# Patient Record
Sex: FEMALE | Race: WHITE | NOT HISPANIC OR LATINO | Employment: OTHER | URBAN - METROPOLITAN AREA
[De-identification: names, ages, dates, MRNs, and addresses within clinical notes are randomized per-mention and may not be internally consistent; named-entity substitution may affect disease eponyms.]

---

## 2023-06-16 DIAGNOSIS — E03.8 OTHER SPECIFIED HYPOTHYROIDISM: Primary | ICD-10-CM

## 2023-06-17 RX ORDER — LEVOTHYROXINE SODIUM 112 UG/1
TABLET ORAL
Qty: 90 TABLET | Refills: 3 | Status: SHIPPED | OUTPATIENT
Start: 2023-06-17 | End: 2024-01-30

## 2023-11-01 ENCOUNTER — APPOINTMENT (OUTPATIENT)
Dept: OTOLARYNGOLOGY | Facility: CLINIC | Age: 68
End: 2023-11-01
Payer: MEDICARE

## 2023-12-06 ENCOUNTER — APPOINTMENT (OUTPATIENT)
Dept: OTOLARYNGOLOGY | Facility: CLINIC | Age: 68
End: 2023-12-06
Payer: MEDICARE

## 2023-12-07 ENCOUNTER — ANCILLARY PROCEDURE (OUTPATIENT)
Dept: RADIOLOGY | Facility: CLINIC | Age: 68
End: 2023-12-07
Payer: MEDICARE

## 2023-12-07 DIAGNOSIS — Z12.31 SCREENING MAMMOGRAM FOR BREAST CANCER: ICD-10-CM

## 2023-12-07 PROCEDURE — 77063 BREAST TOMOSYNTHESIS BI: CPT | Performed by: RADIOLOGY

## 2023-12-07 PROCEDURE — 77067 SCR MAMMO BI INCL CAD: CPT | Performed by: RADIOLOGY

## 2023-12-07 PROCEDURE — 77067 SCR MAMMO BI INCL CAD: CPT

## 2023-12-12 PROBLEM — E78.5 HYPERLIPIDEMIA: Status: ACTIVE | Noted: 2023-12-12

## 2023-12-12 PROBLEM — E06.3 HASHIMOTO'S THYROIDITIS: Status: ACTIVE | Noted: 2023-12-12

## 2023-12-12 PROBLEM — I49.5 SSS (SICK SINUS SYNDROME) (MULTI): Status: ACTIVE | Noted: 2023-12-12

## 2023-12-12 PROBLEM — M50.30 DDD (DEGENERATIVE DISC DISEASE), CERVICAL: Status: ACTIVE | Noted: 2023-12-12

## 2023-12-12 PROBLEM — Z95.0 PACEMAKER: Status: ACTIVE | Noted: 2023-12-12

## 2023-12-12 PROBLEM — E55.9 VITAMIN D DEFICIENCY: Status: ACTIVE | Noted: 2023-12-12

## 2023-12-12 PROBLEM — E78.49 FAMILIAL HYPERLIPIDEMIA, HIGH LDL: Status: ACTIVE | Noted: 2023-12-12

## 2023-12-12 PROBLEM — M67.814 BICEPS TENDONOSIS OF LEFT SHOULDER: Status: ACTIVE | Noted: 2023-12-12

## 2023-12-12 PROBLEM — Z86.79 HISTORY OF SICK SINUS SYNDROME: Status: ACTIVE | Noted: 2023-12-12

## 2023-12-12 RX ORDER — CYCLOBENZAPRINE HCL 10 MG
TABLET ORAL
COMMUNITY
End: 2023-12-18 | Stop reason: HOSPADM

## 2023-12-13 ENCOUNTER — OFFICE VISIT (OUTPATIENT)
Dept: PRIMARY CARE | Facility: CLINIC | Age: 68
End: 2023-12-13
Payer: MEDICARE

## 2023-12-13 ENCOUNTER — LAB (OUTPATIENT)
Dept: LAB | Facility: LAB | Age: 68
End: 2023-12-13
Payer: MEDICARE

## 2023-12-13 VITALS
BODY MASS INDEX: 30.86 KG/M2 | SYSTOLIC BLOOD PRESSURE: 142 MMHG | DIASTOLIC BLOOD PRESSURE: 84 MMHG | HEART RATE: 72 BPM | OXYGEN SATURATION: 98 % | WEIGHT: 166 LBS

## 2023-12-13 DIAGNOSIS — Z13.21 ENCOUNTER FOR VITAMIN DEFICIENCY SCREENING: ICD-10-CM

## 2023-12-13 DIAGNOSIS — E55.9 VITAMIN D DEFICIENCY: ICD-10-CM

## 2023-12-13 DIAGNOSIS — R73.9 HYPERGLYCEMIA: ICD-10-CM

## 2023-12-13 DIAGNOSIS — Z00.00 MEDICARE ANNUAL WELLNESS VISIT, SUBSEQUENT: Primary | ICD-10-CM

## 2023-12-13 DIAGNOSIS — Z13.6 ENCOUNTER FOR SPECIAL SCREENING EXAMINATION FOR CARDIOVASCULAR DISORDER: ICD-10-CM

## 2023-12-13 DIAGNOSIS — I49.5 SSS (SICK SINUS SYNDROME) (MULTI): ICD-10-CM

## 2023-12-13 DIAGNOSIS — E06.3 HASHIMOTO'S THYROIDITIS: ICD-10-CM

## 2023-12-13 DIAGNOSIS — D64.9 FATIGUE ASSOCIATED WITH ANEMIA: ICD-10-CM

## 2023-12-13 DIAGNOSIS — R21 FACIAL RASH: ICD-10-CM

## 2023-12-13 DIAGNOSIS — E78.2 MIXED HYPERLIPIDEMIA: ICD-10-CM

## 2023-12-13 DIAGNOSIS — M77.9 INFLAMMATION AROUND JOINT: ICD-10-CM

## 2023-12-13 LAB
25(OH)D3 SERPL-MCNC: 49 NG/ML (ref 30–100)
ALBUMIN SERPL BCP-MCNC: 4.2 G/DL (ref 3.4–5)
ALP SERPL-CCNC: 45 U/L (ref 33–136)
ALT SERPL W P-5'-P-CCNC: 13 U/L (ref 7–45)
ANION GAP SERPL CALC-SCNC: 15 MMOL/L (ref 10–20)
AST SERPL W P-5'-P-CCNC: 23 U/L (ref 9–39)
BASOPHILS # BLD AUTO: 0.08 X10*3/UL (ref 0–0.1)
BASOPHILS NFR BLD AUTO: 2 %
BILIRUB SERPL-MCNC: 0.6 MG/DL (ref 0–1.2)
BUN SERPL-MCNC: 15 MG/DL (ref 6–23)
CALCIUM SERPL-MCNC: 9.7 MG/DL (ref 8.6–10.6)
CHLORIDE SERPL-SCNC: 101 MMOL/L (ref 98–107)
CHOLEST SERPL-MCNC: 420 MG/DL (ref 0–199)
CHOLESTEROL/HDL RATIO: 4.9
CO2 SERPL-SCNC: 26 MMOL/L (ref 21–32)
CREAT SERPL-MCNC: 1.05 MG/DL (ref 0.5–1.05)
CRP SERPL-MCNC: 0.47 MG/DL
EOSINOPHIL # BLD AUTO: 0.15 X10*3/UL (ref 0–0.7)
EOSINOPHIL NFR BLD AUTO: 3.7 %
ERYTHROCYTE [DISTWIDTH] IN BLOOD BY AUTOMATED COUNT: 13.5 % (ref 11.5–14.5)
ERYTHROCYTE [SEDIMENTATION RATE] IN BLOOD BY WESTERGREN METHOD: 18 MM/H (ref 0–30)
EST. AVERAGE GLUCOSE BLD GHB EST-MCNC: 111 MG/DL
FERRITIN SERPL-MCNC: 211 NG/ML (ref 8–150)
GFR SERPL CREATININE-BSD FRML MDRD: 58 ML/MIN/1.73M*2
GLUCOSE SERPL-MCNC: 79 MG/DL (ref 74–99)
HBA1C MFR BLD: 5.5 %
HCT VFR BLD AUTO: 38.1 % (ref 36–46)
HCYS SERPL-SCNC: 15.41 UMOL/L (ref 5–13.9)
HDLC SERPL-MCNC: 85.5 MG/DL
HGB BLD-MCNC: 12.5 G/DL (ref 12–16)
IMM GRANULOCYTES # BLD AUTO: 0.01 X10*3/UL (ref 0–0.7)
IMM GRANULOCYTES NFR BLD AUTO: 0.2 % (ref 0–0.9)
LDLC SERPL CALC-MCNC: 317 MG/DL
LYMPHOCYTES # BLD AUTO: 1.54 X10*3/UL (ref 1.2–4.8)
LYMPHOCYTES NFR BLD AUTO: 37.7 %
MCH RBC QN AUTO: 32.7 PG (ref 26–34)
MCHC RBC AUTO-ENTMCNC: 32.8 G/DL (ref 32–36)
MCV RBC AUTO: 100 FL (ref 80–100)
MONOCYTES # BLD AUTO: 0.26 X10*3/UL (ref 0.1–1)
MONOCYTES NFR BLD AUTO: 6.4 %
NEUTROPHILS # BLD AUTO: 2.04 X10*3/UL (ref 1.2–7.7)
NEUTROPHILS NFR BLD AUTO: 50 %
NON HDL CHOLESTEROL: 335 MG/DL (ref 0–149)
NRBC BLD-RTO: 0 /100 WBCS (ref 0–0)
PLATELET # BLD AUTO: 290 X10*3/UL (ref 150–450)
POTASSIUM SERPL-SCNC: 4.4 MMOL/L (ref 3.5–5.3)
PROT SERPL-MCNC: 7.5 G/DL (ref 6.4–8.2)
RBC # BLD AUTO: 3.82 X10*6/UL (ref 4–5.2)
SODIUM SERPL-SCNC: 138 MMOL/L (ref 136–145)
T3FREE SERPL-MCNC: 1.2 PG/ML (ref 2.3–4.2)
T4 FREE SERPL-MCNC: 0.37 NG/DL (ref 0.78–1.48)
THYROPEROXIDASE AB SERPL-ACNC: 38 IU/ML
TRIGL SERPL-MCNC: 90 MG/DL (ref 0–149)
TSH SERPL-ACNC: >120 MIU/L (ref 0.44–3.98)
VLDL: 18 MG/DL (ref 0–40)
WBC # BLD AUTO: 4.1 X10*3/UL (ref 4.4–11.3)

## 2023-12-13 PROCEDURE — 36415 COLL VENOUS BLD VENIPUNCTURE: CPT

## 2023-12-13 PROCEDURE — 84439 ASSAY OF FREE THYROXINE: CPT

## 2023-12-13 PROCEDURE — 1124F ACP DISCUSS-NO DSCNMKR DOCD: CPT | Performed by: STUDENT IN AN ORGANIZED HEALTH CARE EDUCATION/TRAINING PROGRAM

## 2023-12-13 PROCEDURE — 85025 COMPLETE CBC W/AUTO DIFF WBC: CPT

## 2023-12-13 PROCEDURE — 1160F RVW MEDS BY RX/DR IN RCRD: CPT | Performed by: STUDENT IN AN ORGANIZED HEALTH CARE EDUCATION/TRAINING PROGRAM

## 2023-12-13 PROCEDURE — 1159F MED LIST DOCD IN RCRD: CPT | Performed by: STUDENT IN AN ORGANIZED HEALTH CARE EDUCATION/TRAINING PROGRAM

## 2023-12-13 PROCEDURE — 1170F FXNL STATUS ASSESSED: CPT | Performed by: STUDENT IN AN ORGANIZED HEALTH CARE EDUCATION/TRAINING PROGRAM

## 2023-12-13 PROCEDURE — 83036 HEMOGLOBIN GLYCOSYLATED A1C: CPT

## 2023-12-13 PROCEDURE — 86140 C-REACTIVE PROTEIN: CPT

## 2023-12-13 PROCEDURE — 86376 MICROSOMAL ANTIBODY EACH: CPT

## 2023-12-13 PROCEDURE — 86800 THYROGLOBULIN ANTIBODY: CPT

## 2023-12-13 PROCEDURE — 1036F TOBACCO NON-USER: CPT | Performed by: STUDENT IN AN ORGANIZED HEALTH CARE EDUCATION/TRAINING PROGRAM

## 2023-12-13 PROCEDURE — 83090 ASSAY OF HOMOCYSTEINE: CPT

## 2023-12-13 PROCEDURE — 82728 ASSAY OF FERRITIN: CPT

## 2023-12-13 PROCEDURE — G0439 PPPS, SUBSEQ VISIT: HCPCS | Performed by: STUDENT IN AN ORGANIZED HEALTH CARE EDUCATION/TRAINING PROGRAM

## 2023-12-13 PROCEDURE — 99213 OFFICE O/P EST LOW 20 MIN: CPT | Performed by: STUDENT IN AN ORGANIZED HEALTH CARE EDUCATION/TRAINING PROGRAM

## 2023-12-13 PROCEDURE — 84481 FREE ASSAY (FT-3): CPT

## 2023-12-13 PROCEDURE — 85652 RBC SED RATE AUTOMATED: CPT

## 2023-12-13 PROCEDURE — 80061 LIPID PANEL: CPT

## 2023-12-13 PROCEDURE — 82306 VITAMIN D 25 HYDROXY: CPT

## 2023-12-13 PROCEDURE — 84443 ASSAY THYROID STIM HORMONE: CPT

## 2023-12-13 PROCEDURE — 80053 COMPREHEN METABOLIC PANEL: CPT

## 2023-12-13 ASSESSMENT — ENCOUNTER SYMPTOMS
RESPIRATORY NEGATIVE: 1
GASTROINTESTINAL NEGATIVE: 1
NEUROLOGICAL NEGATIVE: 1
ARTHRALGIAS: 1
PSYCHIATRIC NEGATIVE: 1
CARDIOVASCULAR NEGATIVE: 1
CONSTITUTIONAL NEGATIVE: 1

## 2023-12-13 ASSESSMENT — PATIENT HEALTH QUESTIONNAIRE - PHQ9
2. FEELING DOWN, DEPRESSED OR HOPELESS: NOT AT ALL
SUM OF ALL RESPONSES TO PHQ9 QUESTIONS 1 AND 2: 0
1. LITTLE INTEREST OR PLEASURE IN DOING THINGS: NOT AT ALL

## 2023-12-13 ASSESSMENT — ACTIVITIES OF DAILY LIVING (ADL)
BATHING: INDEPENDENT
MANAGING_FINANCES: INDEPENDENT
DOING_HOUSEWORK: INDEPENDENT
TAKING_MEDICATION: INDEPENDENT
DRESSING: INDEPENDENT
GROCERY_SHOPPING: INDEPENDENT

## 2023-12-13 NOTE — PROGRESS NOTES
Subjective   Patient ID: Iva Becker is a 68 y.o. female who presents for Annual Exam (Rash on face).  Rash on her left cheek that appeared in October. Had started some new cosmetic products around her eyes. Stopped these. Still having some redness on her left cheek. Is itchy occasionally.     Is due for her skin check. Saw derm 2 years ago.     Says she has chronic hearing changes. Seeing ENT for follow up tomorrow. Uses flonase occasionally. Feels like she needs to pop her ears constantly.     Sleeping well.     Watches her diet closely.     Wants multiple labs checked. Concerned about inflammatory levels.     Reports colonoscopy 2020 in [place].     Declines bone density screening.         Review of Systems   Constitutional: Negative.    HENT:  Positive for hearing loss.    Respiratory: Negative.     Cardiovascular: Negative.    Gastrointestinal: Negative.    Musculoskeletal:  Positive for arthralgias.   Skin:  Positive for rash.   Neurological: Negative.    Psychiatric/Behavioral: Negative.     All other systems reviewed and are negative.      Objective   Physical Exam  Vitals reviewed.   Constitutional:       General: She is not in acute distress.  HENT:      Head: Normocephalic.      Right Ear: External ear normal.      Left Ear: External ear normal.      Nose: Nose normal.   Eyes:      Extraocular Movements: Extraocular movements intact.      Pupils: Pupils are equal, round, and reactive to light.   Cardiovascular:      Rate and Rhythm: Normal rate and regular rhythm.      Heart sounds: Normal heart sounds.   Pulmonary:      Effort: Pulmonary effort is normal.      Breath sounds: Normal breath sounds.   Abdominal:      Palpations: Abdomen is soft.      Tenderness: There is no abdominal tenderness. There is no guarding.   Musculoskeletal:      Cervical back: Normal range of motion and neck supple.   Skin:     General: Skin is warm and dry.      Comments: Left cheek with erythematous dry patch and small  ulceration   Neurological:      Mental Status: She is alert. Mental status is at baseline.   Psychiatric:         Mood and Affect: Mood normal.         Behavior: Behavior normal.         Body mass index is 30.86 kg/m².      Current Outpatient Medications:     fish oil concentrate (Omega-3) 120-180 mg capsule, Take 1 capsule (1 g) by mouth once daily., Disp: , Rfl:     levothyroxine (Synthroid, Levoxyl) 112 mcg tablet, TAKE 1 TABLET BY MOUTH  DAILY, Disp: 90 tablet, Rfl: 3    cyclobenzaprine (Flexeril) 10 mg tablet, Take 1 tablet 3 times a day by oral route as needed., Disp: , Rfl:       Assessment/Plan   Diagnoses and all orders for this visit:  Medicare annual wellness visit, subsequent  Comments:  UTD on mammogram  need to get colonoscopy records  will get flu shot with   declines DEXA  SSS (sick sinus syndrome) (CMS/[facility])  Hashimoto's thyroiditis  -     T3, free; Future  -     T4, free; Future  -     Thyroid Peroxidase (TPO) Antibody; Future  -     Anti-Thyroglobulin Antibody; Future  -     TSH; Future  -     CBC and Auto Differential; Future  Mixed hyperlipidemia  -     Lipid panel; Future  Inflammation around joint  -     Homocysteine, serum; Future  -     C-reactive protein; Future  -     Sedimentation Rate; Future  -     Ferritin; Future  Hyperglycemia  -     Comprehensive metabolic panel; Future  -     Hemoglobin A1c; Future  Encounter for vitamin deficiency screening  -     Vitamin D 25-Hydroxy,Total (for eval of Vitamin D levels); Future  Encounter for special screening examination for cardiovascular disorder  -     Homocysteine, serum; Future  Fatigue associated with anemia  -     Ferritin; Future  Vitamin D deficiency  -     Vitamin D 25-Hydroxy,Total (for eval of Vitamin D levels); Future  Facial rash  Comments:  recommend following up with derm    Follow up as needed       Dorcas Bedolla DO 12/13/23 11:31 AM

## 2023-12-14 ENCOUNTER — OFFICE VISIT (OUTPATIENT)
Dept: OTOLARYNGOLOGY | Facility: CLINIC | Age: 68
End: 2023-12-14
Payer: MEDICARE

## 2023-12-14 VITALS
DIASTOLIC BLOOD PRESSURE: 78 MMHG | HEIGHT: 61 IN | WEIGHT: 165.57 LBS | RESPIRATION RATE: 16 BRPM | SYSTOLIC BLOOD PRESSURE: 117 MMHG | HEART RATE: 67 BPM | TEMPERATURE: 97.5 F | BODY MASS INDEX: 31.26 KG/M2 | OXYGEN SATURATION: 100 %

## 2023-12-14 DIAGNOSIS — J31.0 CHRONIC RHINITIS: Primary | ICD-10-CM

## 2023-12-14 DIAGNOSIS — E06.3 HASHIMOTO'S THYROIDITIS: Primary | ICD-10-CM

## 2023-12-14 DIAGNOSIS — H93.8X3 SENSATION OF FULLNESS IN BOTH EARS: ICD-10-CM

## 2023-12-14 DIAGNOSIS — H93.13 TINNITUS OF BOTH EARS: ICD-10-CM

## 2023-12-14 DIAGNOSIS — H90.3 BILATERAL SENSORINEURAL HEARING LOSS: ICD-10-CM

## 2023-12-14 LAB — THYROGLOB AB SERPL-ACNC: 1.4 IU/ML (ref 0–4)

## 2023-12-14 PROCEDURE — 1160F RVW MEDS BY RX/DR IN RCRD: CPT | Performed by: STUDENT IN AN ORGANIZED HEALTH CARE EDUCATION/TRAINING PROGRAM

## 2023-12-14 PROCEDURE — 99204 OFFICE O/P NEW MOD 45 MIN: CPT | Performed by: STUDENT IN AN ORGANIZED HEALTH CARE EDUCATION/TRAINING PROGRAM

## 2023-12-14 PROCEDURE — 1126F AMNT PAIN NOTED NONE PRSNT: CPT | Performed by: STUDENT IN AN ORGANIZED HEALTH CARE EDUCATION/TRAINING PROGRAM

## 2023-12-14 PROCEDURE — 1036F TOBACCO NON-USER: CPT | Performed by: STUDENT IN AN ORGANIZED HEALTH CARE EDUCATION/TRAINING PROGRAM

## 2023-12-14 PROCEDURE — 99214 OFFICE O/P EST MOD 30 MIN: CPT | Performed by: STUDENT IN AN ORGANIZED HEALTH CARE EDUCATION/TRAINING PROGRAM

## 2023-12-14 PROCEDURE — 1159F MED LIST DOCD IN RCRD: CPT | Performed by: STUDENT IN AN ORGANIZED HEALTH CARE EDUCATION/TRAINING PROGRAM

## 2023-12-14 RX ORDER — BENZOCAINE .13; .15; .5; 2 G/100G; G/100G; G/100G; G/100G
2 GEL ORAL DAILY
Qty: 8.6 G | Refills: 11 | Status: SHIPPED | OUTPATIENT
Start: 2023-12-14 | End: 2024-02-21

## 2023-12-14 ASSESSMENT — COLUMBIA-SUICIDE SEVERITY RATING SCALE - C-SSRS
2. HAVE YOU ACTUALLY HAD ANY THOUGHTS OF KILLING YOURSELF?: NO
1. IN THE PAST MONTH, HAVE YOU WISHED YOU WERE DEAD OR WISHED YOU COULD GO TO SLEEP AND NOT WAKE UP?: NO
6. HAVE YOU EVER DONE ANYTHING, STARTED TO DO ANYTHING, OR PREPARED TO DO ANYTHING TO END YOUR LIFE?: NO

## 2023-12-14 ASSESSMENT — ENCOUNTER SYMPTOMS
DEPRESSION: 0
OCCASIONAL FEELINGS OF UNSTEADINESS: 0
LOSS OF SENSATION IN FEET: 0

## 2023-12-14 ASSESSMENT — PAIN SCALES - GENERAL: PAINLEVEL: 0-NO PAIN

## 2023-12-14 NOTE — PROGRESS NOTES
SUBJECTIVE  Patient ID: Iva Becker is a 68 y.o. female who presents for New Patient Visit (Bilateral ear pressure/Dizziness related to pressure /tinnitus).    The patient reports that since January 2023 she reports a feeling of pressure in her ears; bilateral. She reports that she has some acoustic trauma at a wedding. She notes intermittent episodes of dizziness; described as loss of balance - somewhat orthostatic in nature. They endorse hearing loss and tinnitus. They deny otalgia and otorrhea. They deny a history of prior ear surgery, other noise exposure, exposure to ototoxic drugs or agents, and family history of hearing loss.     Notes difficulty sinuses. Reports a yearly infection requiring antibiotics and steroids. Uses Nasacort occasionally (worried about worsening small cataract on the left). Uses saline occasionally. No history of nasal/sinus surgery. Has a history of environmental allergies.    Review of Systems  Complete ROS negative except as noted above or on patient intake form and as above.    OBJECTIVE  Physical Exam  CONSTITUTIONAL: Well appearing female who appears stated age.  PSYCHIATRIC: Alert, appropriate mood and affect.  RESPIRATORY: Normal inspiration and expiration and chest wall expansion; no use of accessory muscles to breathe.  VOICE: Clear speech without hoarseness. No stridor nor stertor.  HEAD, FACE, AND SKIN: Symmetric facial feature. No cutaneous masses or lesions were visualized. The parotid and submandibular glands were normal to palpation.  EYES: Pupils were equal in size and reactive to light. Extra-ocular muscle function was intact. No nystagmus was observed. Vision was grossly intact.  EARS: External ears were normally formed with no lesions. The external auditory canals were clear. The tympanic membranes were intact and in the neutral position. No significant retraction pockets nor effusions were appreciated.  NOSE: Nasal dorsum was midline. Anterior rhinoscopy  demonstrated a septum midline. Inferior turbinates were moderately hypertrophied. Some clear drainage appreciated. No obvious nasal masses, polyps, mucopurulence, nor other lesions were appreciated.  ORAL CAVITY: Lips were without lesions. Moist mucous membranes. No lesions appreciated along the gingiva, oral mucosa, nor tongue.  DENTITION: Grossly normal without obvious infection nor inflammation.  OROPHARYNX: No lesion nor mucosal abnormality. The uvula was normal appearing. The tonsils were 0-1+.  NECK: Visualization and palpation of the neck revealed no mass lesions, no thyromegaly or thyroid masses. No cutaneous lesions appreciated.  LYMPHATICS (CERVICAL): There were no palpable lymph nodes in the posterior triangle, submandibular triangle, jugulodigastric region, nor central neck.  NEUROLOGIC: Cranial nerves III, IV, and VI were noted to be intact via extra-ocular muscle movement testing. Cranial nerve V was noted to be intact to soft touch bilaterally. Cranial nerve VII was noted to be intact and symmetric by facial movement. Cranial nerve VIII was tested with normal voice examination and revealed grossly normal hearing. Cranial nerves IX and X noted to be intact by palatal movement. Cranial nerve XI noted to be intact via shoulder shrug.  Cranial nerve XII noted to be intact with active and symmetric tongue movement.     Audiogram  I personally reviewed the patient's audiogram from February 2023.  This demonstrates a relatively symmetric normal sloping to moderate sloping to normal sensorineural hearing loss.  Word recognition scores are 92% on the right and 72% on the left.  She had type a tympanograms.  Acoustic reflexes were preserved.    Radiology  CT head w/o contast 2/25/2022  FINDINGS:   No mass or acute hemorrhage. No evidence of acute infarct.     ASSESSMENT/PLAN  Diagnoses and all orders for this visit:  Chronic rhinitis  -     budesonide (Rhinocort AQ) 32 mcg/actuation nasal spray; Administer 2  sprays into each nostril once daily.  Sensation of fullness in both ears  Bilateral sensorineural hearing loss  Tinnitus of both ears    68 y.o. female presenting with aural fullness in the setting of hearing loss. There is also history of chronic rhinitis.    1.  Bilateral aural fullness, bilateral sensorineural hearing loss, tinnitus  The patient presents to clinic today reporting bilateral aural fullness.  She was concerned that it could be secondary to an acoustic trauma that occurred at a wedding.  However, her audiogram suggest a potential hereditary pattern of hearing loss.  The patient was treated with an oral steroid initially on presentation to my local colleague (Rhoda Mccullough).  There was some discussion of obtaining an MRI but I do not appreciate enough asymmetry that this would be merited at this time.  She does have a prior CT head from an outside facility from 2022 which was negative for intracranial lesion.    There is some questionable component of eustachian tube dysfunction which could also be contributing to her symptoms.  See below.    2.  Chronic rhinitis, ?ETD  The patient is also noting longstanding difficulty with her sinuses.  She notes yearly infections that can take a prolonged course of antibiotics and steroids to clear.  She has been using Nasacort occasionally but has been worried about the potential of developing cataracts.  I recommended that she is concerned about orbital complications we sutured to a steroid spray with decreased absorption.  She was amenable to this suggestion budesonide was sent to her pharmacy.    She will follow-up in February with repeat audiogram to discuss both concerns.    This note was created using speech recognition transcription software. Despite proofreading, typographical errors may be present that affect the meaning of the content. Please contact my office with any questions.

## 2024-01-29 ENCOUNTER — LAB (OUTPATIENT)
Dept: LAB | Facility: LAB | Age: 69
End: 2024-01-29
Payer: MEDICARE

## 2024-01-29 DIAGNOSIS — E03.8 OTHER SPECIFIED HYPOTHYROIDISM: ICD-10-CM

## 2024-01-29 DIAGNOSIS — E06.3 HASHIMOTO'S THYROIDITIS: ICD-10-CM

## 2024-01-29 LAB
T4 FREE SERPL-MCNC: 1.7 NG/DL (ref 0.78–1.48)
TSH SERPL-ACNC: 9.37 MIU/L (ref 0.44–3.98)

## 2024-01-29 PROCEDURE — 84443 ASSAY THYROID STIM HORMONE: CPT

## 2024-01-29 PROCEDURE — 84439 ASSAY OF FREE THYROXINE: CPT

## 2024-01-29 PROCEDURE — 36415 COLL VENOUS BLD VENIPUNCTURE: CPT

## 2024-01-30 RX ORDER — LEVOTHYROXINE SODIUM 112 UG/1
TABLET ORAL
Qty: 90 TABLET | Refills: 3 | Status: SHIPPED | OUTPATIENT
Start: 2024-01-30

## 2024-02-21 ENCOUNTER — CLINICAL SUPPORT (OUTPATIENT)
Dept: AUDIOLOGY | Facility: CLINIC | Age: 69
End: 2024-02-21
Payer: MEDICARE

## 2024-02-21 ENCOUNTER — OFFICE VISIT (OUTPATIENT)
Dept: OTOLARYNGOLOGY | Facility: CLINIC | Age: 69
End: 2024-02-21
Payer: MEDICARE

## 2024-02-21 VITALS
WEIGHT: 164.5 LBS | BODY MASS INDEX: 30.27 KG/M2 | HEART RATE: 62 BPM | OXYGEN SATURATION: 99 % | HEIGHT: 62 IN | RESPIRATION RATE: 18 BRPM | TEMPERATURE: 98 F | SYSTOLIC BLOOD PRESSURE: 117 MMHG | DIASTOLIC BLOOD PRESSURE: 63 MMHG

## 2024-02-21 DIAGNOSIS — H93.8X3 EAR PRESSURE, BILATERAL: Primary | ICD-10-CM

## 2024-02-21 DIAGNOSIS — H93.8X3 SENSATION OF FULLNESS IN BOTH EARS: ICD-10-CM

## 2024-02-21 DIAGNOSIS — H90.3 ASYMMETRIC SNHL (SENSORINEURAL HEARING LOSS): Primary | ICD-10-CM

## 2024-02-21 PROCEDURE — 92557 COMPREHENSIVE HEARING TEST: CPT | Performed by: AUDIOLOGIST

## 2024-02-21 PROCEDURE — 99213 OFFICE O/P EST LOW 20 MIN: CPT | Performed by: STUDENT IN AN ORGANIZED HEALTH CARE EDUCATION/TRAINING PROGRAM

## 2024-02-21 PROCEDURE — 1160F RVW MEDS BY RX/DR IN RCRD: CPT | Performed by: STUDENT IN AN ORGANIZED HEALTH CARE EDUCATION/TRAINING PROGRAM

## 2024-02-21 PROCEDURE — 1036F TOBACCO NON-USER: CPT | Performed by: STUDENT IN AN ORGANIZED HEALTH CARE EDUCATION/TRAINING PROGRAM

## 2024-02-21 PROCEDURE — 1159F MED LIST DOCD IN RCRD: CPT | Performed by: STUDENT IN AN ORGANIZED HEALTH CARE EDUCATION/TRAINING PROGRAM

## 2024-02-21 PROCEDURE — 1126F AMNT PAIN NOTED NONE PRSNT: CPT | Performed by: STUDENT IN AN ORGANIZED HEALTH CARE EDUCATION/TRAINING PROGRAM

## 2024-02-21 PROCEDURE — 92567 TYMPANOMETRY: CPT | Performed by: AUDIOLOGIST

## 2024-02-21 SDOH — ECONOMIC STABILITY: FOOD INSECURITY: WITHIN THE PAST 12 MONTHS, YOU WORRIED THAT YOUR FOOD WOULD RUN OUT BEFORE YOU GOT MONEY TO BUY MORE.: NEVER TRUE

## 2024-02-21 SDOH — ECONOMIC STABILITY: FOOD INSECURITY: WITHIN THE PAST 12 MONTHS, THE FOOD YOU BOUGHT JUST DIDN'T LAST AND YOU DIDN'T HAVE MONEY TO GET MORE.: NEVER TRUE

## 2024-02-21 ASSESSMENT — LIFESTYLE VARIABLES
HOW OFTEN DO YOU HAVE SIX OR MORE DRINKS ON ONE OCCASION: NEVER
SKIP TO QUESTIONS 9-10: 1
AUDIT-C TOTAL SCORE: 1
HOW OFTEN DO YOU HAVE A DRINK CONTAINING ALCOHOL: MONTHLY OR LESS
HOW MANY STANDARD DRINKS CONTAINING ALCOHOL DO YOU HAVE ON A TYPICAL DAY: 1 OR 2

## 2024-02-21 ASSESSMENT — ENCOUNTER SYMPTOMS
OCCASIONAL FEELINGS OF UNSTEADINESS: 0
LOSS OF SENSATION IN FEET: 0
DEPRESSION: 0

## 2024-02-21 ASSESSMENT — PAIN SCALES - GENERAL: PAINLEVEL: 0-NO PAIN

## 2024-02-21 NOTE — PROGRESS NOTES
"SUBJECTIVE  Patient ID: Iva Becker is a 69 y.o. female who presents for No chief complaint on file..    History 12/14/2023:  The patient reports that since January 2023 she reports a feeling of pressure in her ears; bilateral. She reports that she has some acoustic trauma at a wedding. She notes intermittent episodes of dizziness; described as loss of balance - somewhat orthostatic in nature. They endorse hearing loss and tinnitus. They deny otalgia and otorrhea. They deny a history of prior ear surgery, other noise exposure, exposure to ototoxic drugs or agents, and family history of hearing loss.     Notes difficulty sinuses. Reports a yearly infection requiring antibiotics and steroids. Uses Nasacort occasionally (worried about worsening small cataract on the left). Uses saline occasionally. No history of nasal/sinus surgery. Has a history of environmental allergies.    Update 2/21/2024:  Follow-up for several concerns.  Ear fullness has not much changed. Had \"allergic reaction\" to Nasonex with swelling of the nose. Improved after stopping medication; no intervention. Has noted persistent bilateral ear fullness. Tried hearing aids but did not appreciate much change in pressure. Does note improvement in hearing (likes them in [employer]).  She has been irrigating every other day which she finds helpful for her sinuses. Feels that this has been slightly helpful for ear fullness.    OBJECTIVE  Physical Exam  CONSTITUTIONAL: Well appearing female who appears stated age.  PSYCHIATRIC: Alert, appropriate mood and affect.  RESPIRATORY: Normal inspiration and expiration and chest wall expansion; no use of accessory muscles to breathe.  VOICE: Clear speech without hoarseness. No stridor nor stertor.  HEAD, FACE, AND SKIN: Symmetric facial feature.  EYES: Pupils were equal in size and reactive to light. Extra-ocular muscle function was intact. No nystagmus was observed. Vision was grossly intact.  EARS: External ears were " normally formed with no lesions. The external auditory canals were clear. The tympanic membranes were intact and in the neutral position. No significant retraction pockets nor effusions were appreciated.  NOSE: Nasal dorsum was midline. Anterior rhinoscopy demonstrated a septum midline. Inferior turbinates were moderately hypertrophied. Some clear drainage appreciated. No obvious nasal masses, polyps, mucopurulence, nor other lesions were appreciated.    Audiogram  I personally viewed the patient's audiogram from today compared to her prior from February 2023.  This demonstrates a relatively symmetric normal sloping to moderate sloping to normal sensorineural hearing loss.  There is some mild asymmetry noted between 1002 1000 Hz with the left ear slightly worse than the right.  Word recognition scores are 88 % on the right and 84 % on the left.  She had type a tympanograms.        ASSESSMENT/PLAN  Diagnoses and all orders for this visit:  Asymmetric SNHL (sensorineural hearing loss)  -     MR IAC w and wo IV contrast; Future  Sensation of fullness in both ears    69 y.o. female presenting with aural fullness in the setting of hearing loss. There is also history of chronic rhinitis.    1.  Bilateral aural fullness, bilateral/asymmetric sensorineural hearing loss, tinnitus  The patient presents to clinic today reporting bilateral aural fullness.  She was concerned that it could be secondary to an acoustic trauma that occurred at a wedding.  However, her audiogram suggest a potential hereditary pattern of hearing loss.  The patient was treated with an oral steroid initially on presentation to my local colleague (Rhoda Mccullough).  There was some discussion of obtaining an MRI but I do not appreciate enough asymmetry that this would be merited at this time.  She does have a prior CT head from an outside facility from 2022 which was negative for intracranial lesion.    The patient returned with follow-up audiogram that  confirms mild asymmetry that is relatively stable over the last year.  Patient is now more interested in potential MRI to evaluate this asymmetry.  This has the added benefit of evaluating her sinuses and the eustachian tube orifice.    There is some questionable component of eustachian tube dysfunction which could also be contributing to her symptoms.  See below.    2.  Chronic rhinitis, ?ETD  The patient is also noting longstanding difficulty with her sinuses.  She notes yearly infections that can take a prolonged course of antibiotics and steroids to clear.  She previously used Nasacort occasionally but was concerned about development of cataracts and so we will switch her to budesonide; she stopped this when she developed significant swelling, potentially secondary to an allergic reaction from this medication.  We discussed potentially retrialing her on Flonase or Nasacort in the future.  Given her type a tympanograms I am concerned that myringotomy may not be of great benefit.  It may be that this is simply a component of her hearing loss manifesting.  We will review the MRI    This note was created using speech recognition transcription software. Despite proofreading, typographical errors may be present that affect the meaning of the content. Please contact my office with any questions.

## 2024-02-21 NOTE — PROGRESS NOTES
"AUDIOLOGY ADULT AUDIOMETRIC EVALUATION      Name:  Iva Becker  :  1955  Age:  69 y.o.  Date of Evaluation:  2024    HISTORY  Reason for visit:  hearing loss  Ms. Becker is seen 2024 at the request of Guido Morgan M.D. for an evaluation of hearing.      Chief complaint:    tinnitus    Hearing loss:  hearing is better when ear pressure is not there; patient has a history of perceived sudden change after hearing a band in 2023.  Tinnitus:   no change  Otitis Media: denies  Otologic surgical history:  denies  Dizziness/imbalance:  experiences brief dizziness, light-headedness with ear pressure  Otalgia:  denies  Ear pressure/fullness:  intermittent ear pressure which can sometimes be improved by popping ears  History of excessive noise exposure:  denies    Hearing aid history: wears hearing aids on a part-time basis; obtained around 2023         EVALUATION  Please find audiogram in \"Media\" tab (Document Type:  Audiology Report) or included at the bottom of this note.    RESULTS   Otoscopic Evaluation: .clear canals bilaterally      Immittance Measures (226 Hz probe tone):   Tympanometry is consistent with normal middle ear pressure and normal tympanic membrane mobility bilaterally.       Ipsilateral acoustic reflexes (500-4000 Hz) are present for the right ear for 500-4000 Hz and could not be tested for the left ear due to inadequate seal.      Test technique:  standard behavioral technique via insert earphones.  Reliability is good.    Pure Tone Audiometry:   Right ear:  Hearing sensitivity is in the normal hearing to mild hearing loss range.     Left ear: Hearing sensitivity is in the normal hearing to moderate hearing loss range bilaterally.  Note asymmetry for 9940-8667 Hz (left worse than right) and 8000 Hz (right worse than left)     Speech Audiometry:        Right Ear:  Speech Reception Threshold (SRT) was obtained at 25 dBHL                 Speech discrimination score was " 88% in quiet when words were presented at 75 dBHL      Left Ear:  Speech Reception Threshold (SRT) was obtained at 30 dBHL                 Speech discrimination score was 84% in quiet when words were presented at 80 dBHL    IMPRESSIONS:  In comparison with previous audiogram of 2/22/2023, hearing is stable bilaterally.    Patient is expected to have communication difficulty in adverse listening environments.    Patient is expected to continue to benefit from devices that provide amplification (e.g., hearing aids) and improve the desired sound signal over that of background noise, as well as from effective communication strategies.      RECOMMENDATIONS  Continue with ENT follow-up with Guido Morgan M.D.   Continue with hearing aid use.     Reassess hearing in 1 year (or sooner if medically indicated or if there is a concern for a change in hearing).    Continue with medical follow-up as indicated.       PATIENT EDUCATION  Discussed results and recommendations with patient.  Questions were addressed and the patient was encouraged to contact our department should concerns arise.       FAIZAN Thomas, [facility]  Licensed Audiologist

## 2024-02-27 PROBLEM — H93.8X3 SENSATION OF FULLNESS IN BOTH EARS: Status: ACTIVE | Noted: 2024-02-27

## 2024-02-27 PROBLEM — H90.3 ASYMMETRIC SNHL (SENSORINEURAL HEARING LOSS): Status: ACTIVE | Noted: 2024-02-27

## 2024-03-05 DIAGNOSIS — Z95.0 PACEMAKER: ICD-10-CM

## 2024-03-05 DIAGNOSIS — Z95.0 HISTORY OF CARDIAC PACEMAKER: ICD-10-CM

## 2024-04-03 ENCOUNTER — APPOINTMENT (OUTPATIENT)
Dept: CARDIOLOGY | Facility: CLINIC | Age: 69
End: 2024-04-03
Payer: MEDICARE

## 2024-04-10 ENCOUNTER — HOSPITAL ENCOUNTER (OUTPATIENT)
Dept: CARDIOLOGY | Facility: CLINIC | Age: 69
Discharge: HOME | End: 2024-04-10
Payer: MEDICARE

## 2024-04-10 DIAGNOSIS — I49.5 SICK SINUS SYNDROME (MULTI): ICD-10-CM

## 2024-04-10 DIAGNOSIS — Z95.0 PRESENCE OF CARDIAC PACEMAKER: ICD-10-CM

## 2024-04-10 PROCEDURE — 93280 PM DEVICE PROGR EVAL DUAL: CPT | Performed by: INTERNAL MEDICINE

## 2024-04-10 PROCEDURE — 93280 PM DEVICE PROGR EVAL DUAL: CPT

## 2024-04-11 DIAGNOSIS — I49.5 SICK SINUS SYNDROME (MULTI): ICD-10-CM

## 2024-04-11 DIAGNOSIS — Z95.0 PRESENCE OF CARDIAC PACEMAKER: ICD-10-CM

## 2024-04-12 ENCOUNTER — HOSPITAL ENCOUNTER (OUTPATIENT)
Dept: RADIOLOGY | Facility: CLINIC | Age: 69
Discharge: HOME | End: 2024-04-12
Payer: MEDICARE

## 2024-04-12 DIAGNOSIS — Z95.0 PACEMAKER: ICD-10-CM

## 2024-04-12 DIAGNOSIS — Z95.0 HISTORY OF CARDIAC PACEMAKER: ICD-10-CM

## 2024-04-12 PROCEDURE — 71046 X-RAY EXAM CHEST 2 VIEWS: CPT

## 2024-04-12 PROCEDURE — 71046 X-RAY EXAM CHEST 2 VIEWS: CPT | Performed by: RADIOLOGY

## 2024-04-13 ENCOUNTER — APPOINTMENT (OUTPATIENT)
Dept: CARDIOLOGY | Facility: HOSPITAL | Age: 69
End: 2024-04-13
Payer: MEDICARE

## 2024-04-13 ENCOUNTER — APPOINTMENT (OUTPATIENT)
Dept: RADIOLOGY | Facility: HOSPITAL | Age: 69
End: 2024-04-13
Payer: MEDICARE

## 2024-04-13 ENCOUNTER — HOSPITAL ENCOUNTER (EMERGENCY)
Facility: HOSPITAL | Age: 69
Discharge: HOME | End: 2024-04-14
Attending: STUDENT IN AN ORGANIZED HEALTH CARE EDUCATION/TRAINING PROGRAM
Payer: MEDICARE

## 2024-04-13 DIAGNOSIS — K59.00 CONSTIPATION, UNSPECIFIED CONSTIPATION TYPE: Primary | ICD-10-CM

## 2024-04-13 DIAGNOSIS — R93.5 ABNORMAL CT OF THE ABDOMEN: ICD-10-CM

## 2024-04-13 LAB
ALBUMIN SERPL BCP-MCNC: 4.3 G/DL (ref 3.4–5)
ALP SERPL-CCNC: 50 U/L (ref 33–136)
ALT SERPL W P-5'-P-CCNC: 13 U/L (ref 7–45)
ANION GAP SERPL CALC-SCNC: 13 MMOL/L (ref 10–20)
APPEARANCE UR: CLEAR
AST SERPL W P-5'-P-CCNC: 18 U/L (ref 9–39)
BASOPHILS # BLD AUTO: 0.07 X10*3/UL (ref 0–0.1)
BASOPHILS NFR BLD AUTO: 0.6 %
BILIRUB SERPL-MCNC: 0.3 MG/DL (ref 0–1.2)
BILIRUB UR STRIP.AUTO-MCNC: NEGATIVE MG/DL
BUN SERPL-MCNC: 22 MG/DL (ref 6–23)
CALCIUM SERPL-MCNC: 9.8 MG/DL (ref 8.6–10.3)
CHLORIDE SERPL-SCNC: 104 MMOL/L (ref 98–107)
CO2 SERPL-SCNC: 26 MMOL/L (ref 21–32)
COLOR UR: ABNORMAL
CREAT SERPL-MCNC: 1.1 MG/DL (ref 0.5–1.05)
EGFRCR SERPLBLD CKD-EPI 2021: 54 ML/MIN/1.73M*2
EOSINOPHIL # BLD AUTO: 0.21 X10*3/UL (ref 0–0.7)
EOSINOPHIL NFR BLD AUTO: 1.9 %
ERYTHROCYTE [DISTWIDTH] IN BLOOD BY AUTOMATED COUNT: 12.8 % (ref 11.5–14.5)
GLUCOSE SERPL-MCNC: 105 MG/DL (ref 74–99)
GLUCOSE UR STRIP.AUTO-MCNC: NEGATIVE MG/DL
HCT VFR BLD AUTO: 41.6 % (ref 36–46)
HGB BLD-MCNC: 13.9 G/DL (ref 12–16)
IMM GRANULOCYTES # BLD AUTO: 0.03 X10*3/UL (ref 0–0.7)
IMM GRANULOCYTES NFR BLD AUTO: 0.3 % (ref 0–0.9)
KETONES UR STRIP.AUTO-MCNC: NEGATIVE MG/DL
LEUKOCYTE ESTERASE UR QL STRIP.AUTO: NEGATIVE
LIPASE SERPL-CCNC: 25 U/L (ref 9–82)
LYMPHOCYTES # BLD AUTO: 2.41 X10*3/UL (ref 1.2–4.8)
LYMPHOCYTES NFR BLD AUTO: 21.9 %
MCH RBC QN AUTO: 31.5 PG (ref 26–34)
MCHC RBC AUTO-ENTMCNC: 33.4 G/DL (ref 32–36)
MCV RBC AUTO: 94 FL (ref 80–100)
MONOCYTES # BLD AUTO: 0.81 X10*3/UL (ref 0.1–1)
MONOCYTES NFR BLD AUTO: 7.4 %
NEUTROPHILS # BLD AUTO: 7.45 X10*3/UL (ref 1.2–7.7)
NEUTROPHILS NFR BLD AUTO: 67.9 %
NITRITE UR QL STRIP.AUTO: NEGATIVE
NRBC BLD-RTO: 0 /100 WBCS (ref 0–0)
PH UR STRIP.AUTO: 6 [PH]
PLATELET # BLD AUTO: 317 X10*3/UL (ref 150–450)
POTASSIUM SERPL-SCNC: 3.6 MMOL/L (ref 3.5–5.3)
PROT SERPL-MCNC: 7.5 G/DL (ref 6.4–8.2)
PROT UR STRIP.AUTO-MCNC: NEGATIVE MG/DL
RBC # BLD AUTO: 4.41 X10*6/UL (ref 4–5.2)
RBC # UR STRIP.AUTO: NEGATIVE /UL
SODIUM SERPL-SCNC: 139 MMOL/L (ref 136–145)
SP GR UR STRIP.AUTO: 1
UROBILINOGEN UR STRIP.AUTO-MCNC: <2 MG/DL
WBC # BLD AUTO: 11 X10*3/UL (ref 4.4–11.3)

## 2024-04-13 PROCEDURE — 74177 CT ABD & PELVIS W/CONTRAST: CPT | Performed by: STUDENT IN AN ORGANIZED HEALTH CARE EDUCATION/TRAINING PROGRAM

## 2024-04-13 PROCEDURE — 84075 ASSAY ALKALINE PHOSPHATASE: CPT | Performed by: STUDENT IN AN ORGANIZED HEALTH CARE EDUCATION/TRAINING PROGRAM

## 2024-04-13 PROCEDURE — 36415 COLL VENOUS BLD VENIPUNCTURE: CPT | Performed by: STUDENT IN AN ORGANIZED HEALTH CARE EDUCATION/TRAINING PROGRAM

## 2024-04-13 PROCEDURE — 2550000001 HC RX 255 CONTRASTS: Performed by: STUDENT IN AN ORGANIZED HEALTH CARE EDUCATION/TRAINING PROGRAM

## 2024-04-13 PROCEDURE — 83690 ASSAY OF LIPASE: CPT | Performed by: STUDENT IN AN ORGANIZED HEALTH CARE EDUCATION/TRAINING PROGRAM

## 2024-04-13 PROCEDURE — 85025 COMPLETE CBC W/AUTO DIFF WBC: CPT | Performed by: STUDENT IN AN ORGANIZED HEALTH CARE EDUCATION/TRAINING PROGRAM

## 2024-04-13 PROCEDURE — 2500000004 HC RX 250 GENERAL PHARMACY W/ HCPCS (ALT 636 FOR OP/ED): Performed by: STUDENT IN AN ORGANIZED HEALTH CARE EDUCATION/TRAINING PROGRAM

## 2024-04-13 PROCEDURE — 99284 EMERGENCY DEPT VISIT MOD MDM: CPT | Mod: 25

## 2024-04-13 PROCEDURE — 93005 ELECTROCARDIOGRAM TRACING: CPT

## 2024-04-13 PROCEDURE — 74177 CT ABD & PELVIS W/CONTRAST: CPT

## 2024-04-13 PROCEDURE — 81003 URINALYSIS AUTO W/O SCOPE: CPT | Performed by: STUDENT IN AN ORGANIZED HEALTH CARE EDUCATION/TRAINING PROGRAM

## 2024-04-13 PROCEDURE — 96374 THER/PROPH/DIAG INJ IV PUSH: CPT | Mod: 59

## 2024-04-13 RX ORDER — BISACODYL 5 MG
5 TABLET, DELAYED RELEASE (ENTERIC COATED) ORAL 2 TIMES DAILY
Qty: 14 TABLET | Refills: 0 | Status: SHIPPED | OUTPATIENT
Start: 2024-04-13 | End: 2024-04-20

## 2024-04-13 RX ORDER — ONDANSETRON HYDROCHLORIDE 2 MG/ML
4 INJECTION, SOLUTION INTRAVENOUS ONCE
Status: COMPLETED | OUTPATIENT
Start: 2024-04-13 | End: 2024-04-13

## 2024-04-13 RX ORDER — POLYETHYLENE GLYCOL 3350 17 G/17G
17 POWDER, FOR SOLUTION ORAL DAILY
Qty: 14 PACKET | Refills: 0 | Status: SHIPPED | OUTPATIENT
Start: 2024-04-13 | End: 2024-04-27

## 2024-04-13 RX ADMIN — ONDANSETRON 4 MG: 2 INJECTION INTRAMUSCULAR; INTRAVENOUS at 21:32

## 2024-04-13 RX ADMIN — IOHEXOL 75 ML: 350 INJECTION, SOLUTION INTRAVENOUS at 22:45

## 2024-04-13 ASSESSMENT — PAIN DESCRIPTION - ORIENTATION: ORIENTATION: LOWER

## 2024-04-13 ASSESSMENT — PAIN DESCRIPTION - PAIN TYPE: TYPE: ACUTE PAIN

## 2024-04-13 ASSESSMENT — COLUMBIA-SUICIDE SEVERITY RATING SCALE - C-SSRS
1. IN THE PAST MONTH, HAVE YOU WISHED YOU WERE DEAD OR WISHED YOU COULD GO TO SLEEP AND NOT WAKE UP?: NO
2. HAVE YOU ACTUALLY HAD ANY THOUGHTS OF KILLING YOURSELF?: NO
6. HAVE YOU EVER DONE ANYTHING, STARTED TO DO ANYTHING, OR PREPARED TO DO ANYTHING TO END YOUR LIFE?: NO

## 2024-04-13 ASSESSMENT — PAIN SCALES - GENERAL: PAINLEVEL_OUTOF10: 8

## 2024-04-13 ASSESSMENT — PAIN DESCRIPTION - DESCRIPTORS: DESCRIPTORS: CRAMPING

## 2024-04-13 ASSESSMENT — PAIN DESCRIPTION - FREQUENCY: FREQUENCY: CONSTANT/CONTINUOUS

## 2024-04-13 ASSESSMENT — PAIN DESCRIPTION - LOCATION: LOCATION: ABDOMEN

## 2024-04-13 ASSESSMENT — PAIN - FUNCTIONAL ASSESSMENT: PAIN_FUNCTIONAL_ASSESSMENT: 0-10

## 2024-04-14 VITALS
HEART RATE: 79 BPM | OXYGEN SATURATION: 96 % | TEMPERATURE: 97.3 F | RESPIRATION RATE: 18 BRPM | DIASTOLIC BLOOD PRESSURE: 77 MMHG | SYSTOLIC BLOOD PRESSURE: 178 MMHG | WEIGHT: 158 LBS | BODY MASS INDEX: 29.08 KG/M2 | HEIGHT: 62 IN

## 2024-04-14 NOTE — ED PROVIDER NOTES
EMERGENCY DEPARTMENT ENCOUNTER      Pt Name: Iva Becker  MRN: [id number]  Birthdate 1955  Date of evaluation: 2024  Provider: Adolph Osullivan DO    CHIEF COMPLAINT       Chief Complaint   Patient presents with    Abdominal Pain     68 y/o female complains of lower abdominal pain and no bm for several days. Patient recently returned from [place]. Denies cp,sob,ha. Complains of nausea without vomiting.        HISTORY OF PRESENT ILLNESS    Iva Becker is a 69 y.o. female who presents to the emergency department with Family for achy abdominal pain throughout.  Patient states this has been ongoing for approximately 1 week.  She is also not had a bowel movement in approximately 1 week.  She is concerned that she may have an obstruction.  Only previous abdominal surgery was a  x 3.  Denies any bloody or tarry stools previously.  She does have some associated nausea without emesis.  Denies any further associated symptoms at this time.  She is never had a bowel obstruction in the past.  Does note around Easter she was having nausea vomiting diarrhea although that subsequently resolved.  She did try taking MiraLAX 1 capful today without relief.          Nursing Notes were reviewed.    REVIEW OF SYSTEMS     CONSTITUTIONAL: Denies fever, sweats, chills.   NEURO: Denies difficulty walking, numbness, weakness, tingling, headache.   HEENT: Denies sore throat, rhinorrhea, changes in vision.   CARDIO: Denies chest pain, palpitations.  PULM: Denies shortness of breath, cough.   GI: Endorses abdominal pain, nausea, constipation.  Denies vomiting, diarrhea, melena, hematochezia.  : Denies painful urination, frequency, hematuria.   MSK: Denies recent trauma.   SKIN: Denies rash, lesions.   ENDOCRINE: Denies unexpected weight-loss.   HEME: Denies bleeding disorder.     PAST MEDICAL HISTORY     Past Medical History:   Diagnosis Date    Disease of thyroid gland     Environmental allergies     Heart disease      Personal history of other diseases of the respiratory system     History of allergic rhinitis    Sinusitis        SURGICAL HISTORY       Past Surgical History:   Procedure Laterality Date     SECTION, CLASSIC      times 3    OTHER SURGICAL HISTORY  2020    Pacemaker insertion       ALLERGIES     Rhinocort [budesonide] and Amoxicillin-pot clavulanate    FAMILY HISTORY       Family History   Problem Relation Name Age of Onset    Stroke Other      Hypertension Other      Diabetes Other      Other (environmental allergies) Other      Heart disease Other          SOCIAL HISTORY       Social History     Socioeconomic History    Marital status:      Spouse name: Not on file    Number of children: Not on file    Years of education: Not on file    Highest education level: Not on file   Occupational History    Not on file   Tobacco Use    Smoking status: Never    Smokeless tobacco: Never   Substance and Sexual Activity    Alcohol use: Never    Drug use: Never    Sexual activity: Not on file   Other Topics Concern    Not on file   Social History Narrative    Not on file     Social Determinants of Health     Financial Resource Strain: Not on file   Food Insecurity: No Food Insecurity (2024)    Hunger Vital Sign     Worried About Running Out of Food in the Last Year: Never true     Ran Out of Food in the Last Year: Never true   Transportation Needs: Not on file   Physical Activity: Not on file   Stress: Not on file   Social Connections: Not on file   Intimate Partner Violence: Not on file   Housing Stability: Not on file       PHYSICAL EXAM   VS: As documented in the triage note from today's date and EMR flowsheet were reviewed.  Gen: Well developed. No acute distress. Seated in bed. Appears nontoxic.   Skin: Warm. Dry. Intact. No rashes or lesions.  Eyes: Pupils equally round and reactive to light. Clear sclera.   HENT: Atraumatic appearance. Mucosal membranes moist. No oral lesions, uvula midline,  airway patent.   CV: Regular rate and regular rhythm. S1, S2. No pedal edema. Warm extremities.  Resp: Nonlabored breathing Clear to auscultation bilaterally. No increased work of breathing.   GI: Soft and no reproducible abdominal tenderness Wells sign McBurney's point tenderness is negative no CVA tenderness. No rebound or guarding. Bowel sounds x4 present.   MSK: Symmetric muscle bulk. No joint swelling in the extremities. Compartments are soft. Neurovascularly intact x4 extremities. Radial pulses +2 equal bilaterally.  Pedal pulses +2 equal bilateral.  Neuro: Alert. Speech fluent. Moving all extremities. No focal deficits. Gait normal.  Psych: Appropriate. Kempt.    DIAGNOSTIC RESULTS   RADIOLOGY:   Non-plain film images such as CT, Ultrasound and MRI are read by the radiologist. Plain radiographic images are visualized and preliminarily interpreted by the emergency physician with the below findings:      Interpretation per the Radiologist below, if available at the time of this note:    CT abdomen pelvis w IV contrast   Final Result   1. Large, solid and somewhat desiccated stool burden is present   throughout the large bowel, with sudden caliber change noted in the   distal sigmoid colon, which demonstrates numerable diverticula and   somewhat thickened wall, but no definite signs of acute   diverticulitis. Findings may represent a sigmoid stricture secondary   to chronic diverticular disease, although underlying mass or other   pathology is not entirely excluded.   2. Several well demarcated hypodense lesions in the right hepatic   lobe are slightly increased in the interim since prior exam in 2008,   but are favored to represent cysts.   3. A 7 mm non occluding radiopaque stone is present in the upper pole   of the left kidney, unchanged in appearance to prior study in 2008.        MACRO:   None.        Signed by: Esther Dalal 4/13/2024 11:33 PM   Dictation workstation:   [id number]            ED  BEDSIDE ULTRASOUND:   Performed by ED Physician - none    LABS:  Labs Reviewed   COMPREHENSIVE METABOLIC PANEL - Abnormal       Result Value    Glucose 105 (*)     Sodium 139      Potassium 3.6      Chloride 104      Bicarbonate 26      Anion Gap 13      Urea Nitrogen 22      Creatinine 1.10 (*)     eGFR 54 (*)     Calcium 9.8      Albumin 4.3      Alkaline Phosphatase 50      Total Protein 7.5      AST 18      Bilirubin, Total 0.3      ALT 13     URINALYSIS WITH REFLEX MICROSCOPIC - Abnormal    Color, Urine Straw      Appearance, Urine Clear      Specific Gravity, Urine 1.004 (*)     pH, Urine 6.0      Protein, Urine NEGATIVE      Glucose, Urine NEGATIVE      Blood, Urine NEGATIVE      Ketones, Urine NEGATIVE      Bilirubin, Urine NEGATIVE      Urobilinogen, Urine <2.0      Nitrite, Urine NEGATIVE      Leukocyte Esterase, Urine NEGATIVE     LIPASE - Normal    Lipase 25      Narrative:     Venipuncture immediately after or during the administration of Metamizole may lead to falsely low results. Testing should be performed immediately prior to Metamizole dosing.   CBC WITH AUTO DIFFERENTIAL    WBC 11.0      nRBC 0.0      RBC 4.41      Hemoglobin 13.9      Hematocrit 41.6      MCV 94      MCH 31.5      MCHC 33.4      RDW 12.8      Platelets 317      Neutrophils % 67.9      Immature Granulocytes %, Automated 0.3      Lymphocytes % 21.9      Monocytes % 7.4      Eosinophils % 1.9      Basophils % 0.6      Neutrophils Absolute 7.45      Immature Granulocytes Absolute, Automated 0.03      Lymphocytes Absolute 2.41      Monocytes Absolute 0.81      Eosinophils Absolute 0.21      Basophils Absolute 0.07         All other labs were within normal range or not returned as of this dictation.    EMERGENCY DEPARTMENT COURSE/MDM:   Vitals:    Vitals:    04/13/24 2045 04/13/24 2310 04/13/24 2345   BP: 176/85 (!) 181/77    BP Location: Right arm     Patient Position: Sitting     Pulse: 87 77 79   Resp: 18 18 18   Temp: 36.3 °C (97.3  "°F)  36.3 °C (97.3 °F)   TempSrc: Temporal  Temporal   SpO2: 95% 96% 96%   Weight: 71.7 kg (158 lb)     Height: 1.575 m (5' 2\")         I reviewed the patient's triage vitals and they are hypertensive recommend follow-up with primary physician for repeat checks.    Due to the above findings the following was ordered Zofran for nausea CT imaging the abdomen pelvis to rule out obstruction basic labs.  EKG by triage.    EKG without acute injury pattern doubt atypical ACS.  Renal function close to baseline.  No significant electrolyte derangements.  No signs of anemia.  No evidence of leukocytosis making infectious etiology less likely urinalysis not consistent with UTI either.  CT imaging consistent with constipation no signs of obstruction.  Does have multiple incidental findings including strictures at the sigmoid colon copy of the report was given to the patient recommended close follow-up as well as colonoscopy as well as follow-up regarding additional incidental findings on imaging.  She was sent with home-going MiraLAX Dulcolax for bowel regimen to help with the constipation.  She is given strict return precautions GI to follow-up with as well as her primary physician and discharged in stable condition she is appreciative of care agreeable with plan.    ED Course as of 04/14/24 0022   Sat Apr 13, 2024 2101 Interpreted by the Emergency Department Attending: ECG revealed normal sinus rhythm at a rate of 78 beats per minute with DC interval 196 , QRS of 87 , QTc of 419.  No acute injury pattern.  No previous EKG to compare. [MG]   7167 I did thoroughly go over the CT imaging as well as the incidental findings patient is aware that she needs close follow-up regarding all of these. [MG]      ED Course User Index  [MG] Adolph Osullivan DO         Diagnoses as of 04/14/24 0022   Constipation, unspecified constipation type   Abnormal CT of the abdomen       Patient was counseled regarding labs, imaging, likely " diagnosis, and plan. All questions were answered.     ------------------------------------------------------------------  Information provided by the patient and family  Past medical history complicating workup previous abdominal surgeries  Previous medical records reviewed previous internal medicine visit 12/13/2023  Considered admission to the hospital although there is no signs of obstruction at this time.  Shared medical decision making patient agreeable with MiraLAX Dulcolax at home with close follow-up.  ------------------------------------------------------------------  ED Medications administered this visit:    Medications   ondansetron (Zofran) injection 4 mg (4 mg intravenous Given 4/13/24 2132)   iohexol (OMNIPaque) 350 mg iodine/mL solution 75 mL (75 mL intravenous Given 4/13/24 2245)       New Prescriptions from this visit:    New Prescriptions    BISACODYL (DULCOLAX) 5 MG EC TABLET    Take 1 tablet (5 mg) by mouth 2 times a day for 7 days. Do not crush, chew, or split.    POLYETHYLENE GLYCOL (GLYCOLAX, MIRALAX) 17 GRAM PACKET    Take 17 g by mouth once daily for 14 days.       Follow-up:  Dorcas Bedolla DO  [address] [facility] [address] [phone]    Schedule an appointment as soon as possible for a visit in 3 days      [facility]  [address]  [facility] [address] [phone]  Go to   If symptoms worsen    Catrachito Tovar MD  [address] [facility] [address]  [facility] [id number] [phone]    Schedule an appointment as soon as possible for a visit in 1 day          Final Impression:   1. Constipation, unspecified constipation type    2. Abnormal CT of the abdomen          Adolph Osullivan DO    (Please note that portions of this note were completed with a voice recognition program.  Efforts were made to edit the dictations but occasionally words are mis-transcribed.)     Adolph Osullivan,   04/14/24 0024

## 2024-04-14 NOTE — DISCHARGE INSTRUCTIONS
As discussed please follow-up with incidental findings on your CT imaging I do believe you likely need a colonoscopy.  Begin the stool regimen as directed Dulcolax twice daily MiraLAX 1 cup daily until having regular stools.  Should you been experiencing fevers chills pain out of proportion symptoms concerning to call 911 or return immediately.

## 2024-04-15 LAB
ATRIAL RATE: 77 BPM
P AXIS: -56 DEGREES
PR INTERVAL: 196 MS
Q ONSET: 249 MS
QRS COUNT: 12 BEATS
QRS DURATION: 87 MS
QT INTERVAL: 367 MS
QTC CALCULATION(BAZETT): 419 MS
QTC FREDERICIA: 400 MS
R AXIS: -25 DEGREES
T AXIS: -84 DEGREES
T OFFSET: 433 MS
VENTRICULAR RATE: 78 BPM

## 2024-05-23 ENCOUNTER — HOSPITAL ENCOUNTER (OUTPATIENT)
Dept: CARDIOLOGY | Facility: HOSPITAL | Age: 69
Discharge: HOME | End: 2024-05-23
Payer: MEDICARE

## 2024-05-23 ENCOUNTER — HOSPITAL ENCOUNTER (OUTPATIENT)
Dept: RADIOLOGY | Facility: HOSPITAL | Age: 69
Discharge: HOME | End: 2024-05-23
Payer: MEDICARE

## 2024-05-23 VITALS — OXYGEN SATURATION: 95 % | SYSTOLIC BLOOD PRESSURE: 131 MMHG | DIASTOLIC BLOOD PRESSURE: 71 MMHG | HEART RATE: 60 BPM

## 2024-05-23 DIAGNOSIS — H90.3 ASYMMETRIC SNHL (SENSORINEURAL HEARING LOSS): ICD-10-CM

## 2024-05-23 DIAGNOSIS — H90.3 SENSORINEURAL HEARING LOSS, BILATERAL: ICD-10-CM

## 2024-05-23 PROCEDURE — 70553 MRI BRAIN STEM W/O & W/DYE: CPT

## 2024-05-23 PROCEDURE — A9575 INJ GADOTERATE MEGLUMI 0.1ML: HCPCS | Performed by: STUDENT IN AN ORGANIZED HEALTH CARE EDUCATION/TRAINING PROGRAM

## 2024-05-23 PROCEDURE — 93286 PERI-PX EVAL PM/LDLS PM IP: CPT | Performed by: INTERNAL MEDICINE

## 2024-05-23 PROCEDURE — 93286 PERI-PX EVAL PM/LDLS PM IP: CPT

## 2024-05-23 PROCEDURE — 70553 MRI BRAIN STEM W/O & W/DYE: CPT | Performed by: RADIOLOGY

## 2024-05-23 PROCEDURE — 2550000001 HC RX 255 CONTRASTS: Performed by: STUDENT IN AN ORGANIZED HEALTH CARE EDUCATION/TRAINING PROGRAM

## 2024-05-23 RX ORDER — GADOTERATE MEGLUMINE 376.9 MG/ML
15 INJECTION INTRAVENOUS
Status: COMPLETED | OUTPATIENT
Start: 2024-05-23 | End: 2024-05-23

## 2024-05-23 RX ADMIN — GADOTERATE MEGLUMINE 13 ML: 376.9 INJECTION INTRAVENOUS at 12:09

## 2024-05-23 NOTE — NURSING NOTE
Patient is alert and able to make needs known; has a non-conditional pacemaker and has been consented by the radiologist for MRI. The device clinical nurse checked patient's pacemaker and set it into MRI safe mode for MRI. The device clinical nurse will be monitoring patient, because the patient pacemaker is non-conditional. ARTURO

## 2024-05-31 ENCOUNTER — TELEPHONE (OUTPATIENT)
Dept: OTOLARYNGOLOGY | Facility: CLINIC | Age: 69
End: 2024-05-31
Payer: MEDICARE

## 2024-05-31 NOTE — TELEPHONE ENCOUNTER
Called to discuss results of recent imaging.  No concerning lesions of the CPA nor IAC.  There is no significant paranasal sinus disease.  There is some expected inflammation towards the anterior structures of the nasal cavity which would be consistent with patient's history of environmental allergies.  No fluid within the middle ear nor mastoid air cells.    Reassurance was provided to the patient.  I recommended evaluation with repeat audiogram on a yearly basis unless she notes changes.  Discussed potentially checking for other autoimmune sources of hearing loss but given the stability we decided on observation with follow-up audiograms.    She can see me as needed.

## 2024-10-09 ENCOUNTER — HOSPITAL ENCOUNTER (OUTPATIENT)
Dept: CARDIOLOGY | Facility: CLINIC | Age: 69
Discharge: HOME | End: 2024-10-09
Payer: MEDICARE

## 2024-10-09 DIAGNOSIS — I49.5 SICK SINUS SYNDROME (MULTI): ICD-10-CM

## 2024-10-09 DIAGNOSIS — Z95.0 PRESENCE OF CARDIAC PACEMAKER: ICD-10-CM

## 2024-10-09 PROCEDURE — 93296 REM INTERROG EVL PM/IDS: CPT

## 2025-01-08 ENCOUNTER — HOSPITAL ENCOUNTER (OUTPATIENT)
Dept: CARDIOLOGY | Facility: CLINIC | Age: 70
Discharge: HOME | End: 2025-01-08
Payer: MEDICARE

## 2025-01-08 DIAGNOSIS — Z95.0 PRESENCE OF CARDIAC PACEMAKER: ICD-10-CM

## 2025-01-08 DIAGNOSIS — I49.5 SICK SINUS SYNDROME (MULTI): ICD-10-CM

## 2025-01-08 PROCEDURE — 93296 REM INTERROG EVL PM/IDS: CPT

## 2025-04-09 ENCOUNTER — HOSPITAL ENCOUNTER (OUTPATIENT)
Dept: CARDIOLOGY | Facility: CLINIC | Age: 70
Discharge: HOME | End: 2025-04-09
Payer: MEDICARE

## 2025-04-09 DIAGNOSIS — I49.5 SICK SINUS SYNDROME (MULTI): ICD-10-CM

## 2025-04-09 DIAGNOSIS — Z95.0 PRESENCE OF CARDIAC PACEMAKER: ICD-10-CM

## 2025-04-09 PROCEDURE — 93296 REM INTERROG EVL PM/IDS: CPT

## 2025-04-09 PROCEDURE — 93294 REM INTERROG EVL PM/LDLS PM: CPT | Performed by: INTERNAL MEDICINE

## 2025-07-09 ENCOUNTER — HOSPITAL ENCOUNTER (OUTPATIENT)
Dept: CARDIOLOGY | Facility: CLINIC | Age: 70
Discharge: HOME | End: 2025-07-09
Payer: MEDICARE

## 2025-07-09 DIAGNOSIS — I49.5 SICK SINUS SYNDROME (MULTI): ICD-10-CM

## 2025-07-09 DIAGNOSIS — Z95.0 PRESENCE OF CARDIAC PACEMAKER: ICD-10-CM

## 2025-07-09 PROCEDURE — 93294 REM INTERROG EVL PM/LDLS PM: CPT | Performed by: INTERNAL MEDICINE

## 2025-07-09 PROCEDURE — 93296 REM INTERROG EVL PM/IDS: CPT

## 2025-07-14 ENCOUNTER — APPOINTMENT (OUTPATIENT)
Dept: PRIMARY CARE | Facility: CLINIC | Age: 70
End: 2025-07-14
Payer: MEDICARE

## 2025-08-21 ENCOUNTER — HOSPITAL ENCOUNTER (OUTPATIENT)
Dept: CARDIOLOGY | Facility: CLINIC | Age: 70
Discharge: HOME | End: 2025-08-21
Payer: MEDICARE

## 2025-08-21 DIAGNOSIS — Z95.0 PRESENCE OF CARDIAC PACEMAKER: ICD-10-CM

## 2025-08-21 DIAGNOSIS — I49.5 SICK SINUS SYNDROME (MULTI): ICD-10-CM

## 2025-08-21 PROCEDURE — 93280 PM DEVICE PROGR EVAL DUAL: CPT | Performed by: INTERNAL MEDICINE

## 2025-08-21 PROCEDURE — 93280 PM DEVICE PROGR EVAL DUAL: CPT

## 2025-09-03 ENCOUNTER — APPOINTMENT (OUTPATIENT)
Dept: CARDIOLOGY | Facility: CLINIC | Age: 70
End: 2025-09-03
Payer: MEDICARE